# Patient Record
Sex: MALE | Race: WHITE | NOT HISPANIC OR LATINO | Employment: FULL TIME | URBAN - METROPOLITAN AREA
[De-identification: names, ages, dates, MRNs, and addresses within clinical notes are randomized per-mention and may not be internally consistent; named-entity substitution may affect disease eponyms.]

---

## 2021-07-18 ENCOUNTER — OFFICE VISIT (OUTPATIENT)
Dept: URGENT CARE | Facility: CLINIC | Age: 46
End: 2021-07-18
Payer: COMMERCIAL

## 2021-07-18 VITALS
BODY MASS INDEX: 24.78 KG/M2 | HEIGHT: 73 IN | WEIGHT: 187 LBS | RESPIRATION RATE: 20 BRPM | DIASTOLIC BLOOD PRESSURE: 70 MMHG | HEART RATE: 80 BPM | TEMPERATURE: 97.7 F | SYSTOLIC BLOOD PRESSURE: 116 MMHG | OXYGEN SATURATION: 96 %

## 2021-07-18 DIAGNOSIS — Z48.02 VISIT FOR SUTURE REMOVAL: ICD-10-CM

## 2021-07-18 PROCEDURE — 99202 OFFICE O/P NEW SF 15 MIN: CPT | Performed by: NURSE PRACTITIONER

## 2021-07-18 RX ORDER — COVID-19 MOLECULAR TEST ASSAY
KIT MISCELLANEOUS
COMMUNITY
Start: 2021-06-03 | End: 2021-07-18

## 2021-07-18 ASSESSMENT — ENCOUNTER SYMPTOMS
FEVER: 0
CHILLS: 0

## 2021-07-18 NOTE — PROGRESS NOTES
"Oswaldo Garcia is a 45 y.o. male who presents for Suture / Staple Removal (done in [place], done on 07/10/2021, 2 dissolvable ones and 4 regular sutures placed, Lt index finger, took abx for 7 days)      HPI this a new problem.  Oswaldo is a 45-year-old male patient presents for suture removal.  He had sutures placed in his left index finger when he was traveling to [place].  Sutures were placed on 7/10/2021.  He had 2 dissolvable sutures and 4 external sutures.  He has had no drainage.  He has been wearing a finger brace because he was told that he cut very close to the tendon.  He took antibiotics that were prescribed to him for 7 days.  He has not had any pain.  He can move his finger without difficulty.  No other aggravating or alleviating factors.  He has brought his records with him from [place] today for review.       Review of Systems   Constitutional: Negative for chills and fever.   Musculoskeletal: Negative for joint pain.       Allergies:     No Known Allergies    PMSFS Hx:  History reviewed. No pertinent past medical history.  History reviewed. No pertinent surgical history.  History reviewed. No pertinent family history.  Social History     Tobacco Use   • Smoking status: Never Smoker   • Smokeless tobacco: Never Used   Substance Use Topics   • Alcohol use: Not Currently       Problems:   There is no problem list on file for this patient.      Medications:   No current outpatient medications on file prior to visit.     No current facility-administered medications on file prior to visit.          Objective:     /70 (BP Location: Right arm, Patient Position: Sitting, BP Cuff Size: Adult)   Pulse 80   Temp 36.5 °C (97.7 °F) (Temporal)   Resp 20   Ht 1.854 m (6' 1\")   Wt 84.8 kg (187 lb)   SpO2 96%   BMI 24.67 kg/m²     Physical Exam  Vitals and nursing note reviewed.   Constitutional:       General: He is not in acute distress.     Appearance: Normal appearance. He is normal weight.   Cardiovascular: "      Rate and Rhythm: Normal rate and regular rhythm.      Pulses: Normal pulses.      Heart sounds: Normal heart sounds.   Musculoskeletal:      Cervical back: Normal range of motion.   Skin:     General: Skin is warm.      Capillary Refill: Capillary refill takes less than 2 seconds.      Comments: 4 sutures removed without dificulty from left index finger. Tolerated well   Dressing applied.   Splint reapplied    Neurological:      Mental Status: He is alert and oriented to person, place, and time.   Psychiatric:         Mood and Affect: Mood normal.         Behavior: Behavior normal.         Thought Content: Thought content normal.         Assessment /Associated Orders:      1. Visit for suture removal         Medical Decision Making:    Pt is clinically stable at today's acute urgent care visit.  No acute distress noted.    Return to urgent care prn if new or worsening sx or if there is no improvement in condition prn.      Continue to wear finger splint as directed for 3 more days.     I personally reviewed prior external notes and test results pertinent to today's visit.  I have independently reviewed and interpreted all diagnostics ordered during this urgent care acute visit.   Time spent evaluating this patient was at least 20 minutes and includes preparing for visit, counseling/education, exam and evaluation, obtaining history, independent interpretation, ordering lab/test/procedures,medication management and documentation.

## 2021-08-03 ENCOUNTER — HOSPITAL ENCOUNTER (EMERGENCY)
Facility: MEDICAL CENTER | Age: 46
End: 2021-08-04
Attending: EMERGENCY MEDICINE
Payer: COMMERCIAL

## 2021-08-03 VITALS
RESPIRATION RATE: 16 BRPM | SYSTOLIC BLOOD PRESSURE: 136 MMHG | OXYGEN SATURATION: 96 % | WEIGHT: 186.07 LBS | TEMPERATURE: 97.3 F | HEIGHT: 73 IN | HEART RATE: 77 BPM | DIASTOLIC BLOOD PRESSURE: 90 MMHG | BODY MASS INDEX: 24.66 KG/M2

## 2021-08-03 DIAGNOSIS — S61.317A LACERATION OF LEFT LITTLE FINGER WITH DAMAGE TO NAIL, FOREIGN BODY PRESENCE UNSPECIFIED, INITIAL ENCOUNTER: ICD-10-CM

## 2021-08-03 PROCEDURE — 303353 HCHG DERMABOND SKIN ADHESIVE

## 2021-08-03 PROCEDURE — 700111 HCHG RX REV CODE 636 W/ 250 OVERRIDE (IP): Performed by: EMERGENCY MEDICINE

## 2021-08-03 PROCEDURE — 304999 HCHG REPAIR-SIMPLE/INTERMED LEVEL 1

## 2021-08-03 PROCEDURE — 99282 EMERGENCY DEPT VISIT SF MDM: CPT

## 2021-08-03 PROCEDURE — 90715 TDAP VACCINE 7 YRS/> IM: CPT | Performed by: EMERGENCY MEDICINE

## 2021-08-03 PROCEDURE — 90471 IMMUNIZATION ADMIN: CPT

## 2021-08-03 PROCEDURE — 304217 HCHG IRRIGATION SYSTEM

## 2021-08-03 RX ORDER — CEPHALEXIN 500 MG/1
500 CAPSULE ORAL 2 TIMES DAILY
Qty: 10 CAPSULE | Refills: 0 | Status: SHIPPED | OUTPATIENT
Start: 2021-08-03 | End: 2021-08-08

## 2021-08-03 RX ORDER — LIDOCAINE HYDROCHLORIDE 10 MG/ML
20 INJECTION, SOLUTION INFILTRATION; PERINEURAL ONCE
Status: DISCONTINUED | OUTPATIENT
Start: 2021-08-03 | End: 2021-08-04 | Stop reason: HOSPADM

## 2021-08-03 RX ADMIN — CLOSTRIDIUM TETANI TOXOID ANTIGEN (FORMALDEHYDE INACTIVATED), CORYNEBACTERIUM DIPHTHERIAE TOXOID ANTIGEN (FORMALDEHYDE INACTIVATED), BORDETELLA PERTUSSIS TOXOID ANTIGEN (GLUTARALDEHYDE INACTIVATED), BORDETELLA PERTUSSIS FILAMENTOUS HEMAGGLUTININ ANTIGEN (FORMALDEHYDE INACTIVATED), BORDETELLA PERTUSSIS PERTACTIN ANTIGEN, AND BORDETELLA PERTUSSIS FIMBRIAE 2/3 ANTIGEN 0.5 ML: 5; 2; 2.5; 5; 3; 5 INJECTION, SUSPENSION INTRAMUSCULAR at 22:55

## 2021-08-03 ASSESSMENT — ENCOUNTER SYMPTOMS: FEVER: 0

## 2021-08-04 ENCOUNTER — HOSPITAL ENCOUNTER (EMERGENCY)
Facility: MEDICAL CENTER | Age: 46
End: 2021-08-04
Attending: EMERGENCY MEDICINE | Admitting: EMERGENCY MEDICINE
Payer: COMMERCIAL

## 2021-08-04 VITALS
RESPIRATION RATE: 18 BRPM | DIASTOLIC BLOOD PRESSURE: 85 MMHG | TEMPERATURE: 98 F | OXYGEN SATURATION: 95 % | WEIGHT: 185.41 LBS | HEIGHT: 73 IN | BODY MASS INDEX: 24.57 KG/M2 | SYSTOLIC BLOOD PRESSURE: 121 MMHG | HEART RATE: 72 BPM

## 2021-08-04 DIAGNOSIS — Z51.89 ENCOUNTER FOR WOUND RE-CHECK: ICD-10-CM

## 2021-08-04 PROCEDURE — 99282 EMERGENCY DEPT VISIT SF MDM: CPT

## 2021-08-04 NOTE — ED TRIAGE NOTES
Chief Complaint   Patient presents with   • Hand Laceration     left hand small finger laceration after running into a fence while having a dog pull him      Pt ambulatory to triage for above complaint.      Pt is alert/oriented and follows commands. Pt speaking in full sentences and responds appropriately to questions. No acute distress noted in triage and respirations are even and unlabored.     Pt placed in lobby and educated on triage process. Pt encouraged to alert staff for any changes in condition.

## 2021-08-04 NOTE — ED PROVIDER NOTES
"ED Provider Note    Scribed for Ritchie Houston M.D. by Demi Perez. 8/4/2021  10:08 AM    Primary care provider: Austin FOOTE M.D.  Means of arrival: Walk in  History obtained from: patient   History limited by: none    CHIEF COMPLAINT  Chief Complaint   Patient presents with    Follow-Up     seen in ED last night for L 5th digit injury; concerned for bleeding overnight       HPI  Oswaldo Garcia is a 45 y.o. male who presents to the Emergency Department for reevaluation of a laceration. Patient was seen in the ED last night for a laceration to his left pinky finger and the wound closed with dermabond. He noticed some bleeding last night and was concerned that he may need further intervention to close the wound. Patient was given a referral to a hand specialist, however they were not able to see him emergent.    REVIEW OF SYSTEMS  Pertinent positives include laceration and bleeding.   Pertinent negatives include no numbness.    See HPI for further details.     PAST MEDICAL HISTORY   none pertinent     SURGICAL HISTORY  patient denies any surgical history    SOCIAL HISTORY  Social History     Tobacco Use    Smoking status: Never Smoker    Smokeless tobacco: Never Used   Vaping Use    Vaping Use: Never used   Substance Use Topics    Alcohol use: Yes     Comment: wine once in a while     Drug use: Not Currently      Social History     Substance and Sexual Activity   Drug Use Not Currently       FAMILY HISTORY  History reviewed. No pertinent family history.    CURRENT MEDICATIONS  Home Medications    **Home medications have not yet been reviewed for this encounter**         ALLERGIES  No Known Allergies    PHYSICAL EXAM  VITAL SIGNS: /89   Pulse 88   Temp 36.3 °C (97.3 °F) (Temporal)   Resp 16   Ht 1.854 m (6' 1\")   Wt 84.1 kg (185 lb 6.5 oz)   SpO2 97%   BMI 24.46 kg/m²     Nursing note and vitals reviewed.  Constitutional: Well-developed and well-nourished. No distress.   HENT: Head is " normocephalic and atraumatic. Oropharynx is clear and moist without exudate or erythema.   Eyes: Pupils are equal, round, and reactive to light. Conjunctiva are normal.   Cardiovascular: Normal rate and regular rhythm. No murmur heard. Normal radial pulses.  Pulmonary/Chest: Breath sounds normal. No wheezes or rales.   Abdominal: Soft and non-tender. No distention    Musculoskeletal: Well repaired laceration of the dorsal aspect of distal phalanx, small surrounding hematoma less than 50 % of the nail, Extremities exhibit normal range of motion without edema or tenderness.   Neurological: Awake, alert and oriented to person, place, and time. No focal deficits noted.  Skin: Skin is warm and dry. No rash.   Psychiatric: Normal mood and affect. Appropriate for clinical situation.    COURSE & MEDICAL DECISION MAKING  Nursing notes, VS, PMSFHx reviewed in chart.     10:08 AM - Patient seen and examined at bedside. I informed the patient that the wound is closed adequately and bleeding is expected. No further intervention is necessary. Advised the patient to continue to follow up with the hand specialist. Recommended keeping the wound covered until fully healed. Provided further reassurance with care of his wound. Discussed return precautions. Patient will be discharged at this time. He verbalizes agreement with discharge and plan of care.     The patient will return for new or worsening symptoms and is stable at the time of discharge.    DISPOSITION:  Patient will be discharged home in stable condition.    FOLLOW UP:  Austin FOOTE M.D.  [address] [facility] [address] [phone]    Schedule an appointment as soon as possible for a visit       [facility], Emergency Dept  [address] [facility] [address] [phone]    If symptoms worsen    FINAL IMPRESSION  1. Encounter for wound re-check          IDemi (Scribsara), vasyl scribing for, and in the presence of, Ritchie MCGREGOR  JOHN Houston.    Electronically signed by: Demi Perez (Scribe), 8/4/2021    I, Ritchie Houston M.D. personally performed the services described in this documentation, as scribed by Demi Perez in my presence, and it is both accurate and complete. E    The note accurately reflects work and decisions made by me.  Ritchie Houston M.D.  8/4/2021  2:36 PM

## 2021-08-04 NOTE — ED TRIAGE NOTES
"Oswaldo Garcia  Chief Complaint   Patient presents with   • Follow-Up     seen in ED last night for L 5th digit injury; concerned for bleeding overnight     Pt ambulated to triage without difficulty. Pt arrives for wound check to L 5th digit. Pt seen at last night after injuring finger. Pt states he received \"glue\" vs sutures. Pt concerned for dried blood on bandage overnight. Pt wants to make sure he does nto need sutures at this time.     Patient informed of triage process and to inform staff of any changes/worsening symptoms. Pt verbalized understanding. Pt denies concerns. Pt back to waiting room.     /89   Pulse 88   Temp 36.3 °C (97.3 °F) (Temporal)   Resp 16   Ht 1.854 m (6' 1\")   Wt 84.1 kg (185 lb 6.5 oz)   SpO2 97%     "

## 2021-08-04 NOTE — ED PROVIDER NOTES
"ED Provider Note    Scribed for AIDE Flores II* by Pebbles Vincent. 8/3/2021  10:30 PM    Means of Arrival: Walk in   History obtained by: Patient  Limitations: None    CHIEF COMPLAINT  Chief Complaint   Patient presents with   • Hand Laceration     left hand small finger laceration after running into a fence while having a dog pull him        HPI  Oswaldo Garcia is a 45 y.o. male who presents to the Emergency Department with a left pinky finger laceration that occurred today. Per Oswaldo, he was walking his dog and hit his left pinky nail and finger against a sharp piece of metal. He has not had his tetanus shot and is requesting to get one. He has no allergies to medication. It has been over 6 hours since injury occurred.       REVIEW OF SYSTEMS  Review of Systems   Constitutional: Negative for fever.   Skin:        Positive for finger laceration.    No recent illness.   See HPI for further details.    PAST MEDICAL HISTORY  None    SOCIAL HISTORY  Social History     Tobacco Use   • Smoking status: Never Smoker   • Smokeless tobacco: Never Used   Vaping Use   • Vaping Use: Never used   Substance and Sexual Activity   • Alcohol use: Yes     Comment: wine once in a while    • Drug use: Not Currently   • Sexual activity: None       SURGICAL HISTORY  patient denies any surgical history    CURRENT MEDICATIONS  Home Medications     Reviewed by Sloan Landers R.N. (Registered Nurse) on 08/03/21 at 8553  Med List Status: Not Addressed   Medication Last Dose Status        Patient Augusto Taking any Medications                       ALLERGIES  No Known Allergies    PHYSICAL EXAM  VITAL SIGNS: /90   Pulse 77   Temp 36.3 °C (97.3 °F) (Temporal)   Resp 16   Ht 1.854 m (6' 1\")   Wt 84.4 kg (186 lb 1.1 oz)   SpO2 96%   BMI 24.55 kg/m²     Pulse ox interpretation: I interpret this pulse ox as normal.  Constitutional: Alert in no apparent distress. Well appearing male.   HENT: No signs of trauma, Bilateral external " ears normal, Nose normal.   Eyes: Pupils are equal, Conjunctiva normal, Non-icteric.   Cardiovascular: Regular rate   Thorax & Lungs: Normal breath sounds, No respiratory distress, No wheezing, No chest tenderness.   MSK&Skin: Left little finger laceration around the base of the nail that wraps around to the lateral portion of the distal finger. Subungal hematoma at lateral side of nail. See diagram. Distal finger is well perfused with brisk capillary refill.  Neurologic: Alert , Normal movements and strength at all digits.     Physical Exam  Musculoskeletal:        Hands:            Laceration Repair Procedure Note    Indication: Laceration    Procedure: The patient was placed in the appropriate position and anesthesia around the laceration was obtained by infiltration using 1% Lidocaine without epinephrine. The area was then irrigated with normal saline. The laceration was closed with Dermabond. There were no additional lacerations requiring repair. The wound area was then dressed with a bandage.      Total repaired wound length: 1 cm.     Other Items: None    The patient tolerated the procedure well. Wound very well approximated.     Complications: None          COURSE & MEDICAL DECISION MAKING  Pertinent Labs & Imaging studies reviewed. (See chart for details)    10:30 PM This is a 45 y.o. male who presents with with laceration to distal left little finger. There is laceration of nail at the middle and base of nail, well approximated (no gap). There is then extension of the laceration involving thin layer of skin at base of digit going to lateral side of the finger. There is a subungal hematoma and this was evacuated as best as possible through the laceration at the nail. We spent nearly 30 minutes discussing repair. Attempted digital block but this did not provide very adequate anesthesia. Ultimately, we decided to close wound using skin adhesive. I felt this was a good option because the wound was not gaping,  he would have a difficult time tolerating suture repair/further local anesthetic, and the thin layer of skin around base of nail might not hold skin together well (suture may pull through the skin because so thin). The thin layer of skin was pale compared to surrounding skin and I communicated to Mr. Garcia that this skin may not take. It could slough off as healing takes place. Tdap booster will be given. I have also decided to give prophylactic antibiotic given time from injury to repair. I prescribed keflex. We discussed care of wound at home, to avoid alcohol or petroleum based products.     11:24 PM- Performed laceration repair as noted above.      The patient will return for worsening symptoms and is stable at the time of discharge. The patient verbalizes understanding and will comply. Guidance provided on appropriate use of medications.    DISPOSITION:  Patient will be discharged home in stable condition.    FOLLOW UP:  Aneesh Holbrook M.D.  [address] [facility] [address]  [phone]      If problems with finger that are not emergent (no infection, questions about healing) follow up with Hand specialists at [facility]    [facility], Emergency Dept  [address] [place] [id number] [phone]    spreading redness at finger      OUTPATIENT MEDICATIONS:  Discharge Medication List as of 8/4/2021 12:13 AM      START taking these medications    Details   cephALEXin (KEFLEX) 500 MG Cap Take 1 capsule by mouth 2 times a day for 5 days., Disp-10 capsule, R-0, Normal               FINAL IMPRESSION  1. Laceration of left little finger with damage to nail, foreign body presence unspecified, initial encounter          Pebbles GIRARD), am scribing for, and in the presence of, GEORGIANA Flores II.    Electronically signed by: Pebbles Estrada), 8/3/2021    IImmanuel II, M* personally performed the services described in this documentation, as  scribed by Pebbles Vincent in my presence, and it is both accurate and complete.    The note accurately reflects work and decisions made by me.  Immanuel Cline II, M.D.  8/4/2021  12:39 AM